# Patient Record
Sex: MALE | Race: BLACK OR AFRICAN AMERICAN | NOT HISPANIC OR LATINO
[De-identification: names, ages, dates, MRNs, and addresses within clinical notes are randomized per-mention and may not be internally consistent; named-entity substitution may affect disease eponyms.]

---
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
## 2025-06-10 NOTE — PRE-OP CHECKLIST, PEDIATRIC - ISOLATION PRECAUTIONS
Macular erythema of the mcp joints into the fingers.  H/o lupus.  No known history of dermatomyositis.   Currently on Rituxan which would treat lupus.    Plan: As per dermatology              none